# Patient Record
Sex: MALE | Race: WHITE | NOT HISPANIC OR LATINO | URBAN - METROPOLITAN AREA
[De-identification: names, ages, dates, MRNs, and addresses within clinical notes are randomized per-mention and may not be internally consistent; named-entity substitution may affect disease eponyms.]

---

## 2019-08-16 ENCOUNTER — APPOINTMENT (OUTPATIENT)
Dept: URBAN - METROPOLITAN AREA CLINIC 186 | Age: 54
Setting detail: DERMATOLOGY
End: 2019-08-17

## 2019-08-16 DIAGNOSIS — Z87.2 PERSONAL HISTORY OF DISEASES OF THE SKIN AND SUBCUTANEOUS TISSUE: ICD-10-CM

## 2019-08-16 DIAGNOSIS — L82.0 INFLAMED SEBORRHEIC KERATOSIS: ICD-10-CM

## 2019-08-16 DIAGNOSIS — L82.1 OTHER SEBORRHEIC KERATOSIS: ICD-10-CM

## 2019-08-16 DIAGNOSIS — D22 MELANOCYTIC NEVI: ICD-10-CM

## 2019-08-16 PROBLEM — D48.5 NEOPLASM OF UNCERTAIN BEHAVIOR OF SKIN: Status: ACTIVE | Noted: 2019-08-16

## 2019-08-16 PROCEDURE — OTHER DIAGNOSIS COMMENT: OTHER

## 2019-08-16 PROCEDURE — 11103 TANGNTL BX SKIN EA SEP/ADDL: CPT

## 2019-08-16 PROCEDURE — OTHER LIQUID NITROGEN: OTHER

## 2019-08-16 PROCEDURE — OTHER SUNSCREEN RECOMMENDATIONS: OTHER

## 2019-08-16 PROCEDURE — OTHER COUNSELING: OTHER

## 2019-08-16 PROCEDURE — OTHER REASSURANCE: OTHER

## 2019-08-16 PROCEDURE — 17110 DESTRUCT B9 LESION 1-14: CPT

## 2019-08-16 PROCEDURE — OTHER BIOPSY BY SHAVE METHOD: OTHER

## 2019-08-16 PROCEDURE — 11102 TANGNTL BX SKIN SINGLE LES: CPT | Mod: 59

## 2019-08-16 PROCEDURE — OTHER EDUCATIONAL RESOURCES PROVIDED: OTHER

## 2019-08-16 PROCEDURE — 99213 OFFICE O/P EST LOW 20 MIN: CPT | Mod: 25

## 2019-08-16 ASSESSMENT — LOCATION SIMPLE DESCRIPTION DERM
LOCATION SIMPLE: RIGHT UPPER BACK
LOCATION SIMPLE: LEFT UPPER BACK
LOCATION SIMPLE: RIGHT LOWER BACK

## 2019-08-16 ASSESSMENT — LOCATION DETAILED DESCRIPTION DERM
LOCATION DETAILED: RIGHT SUPERIOR LATERAL UPPER BACK
LOCATION DETAILED: RIGHT INFERIOR LATERAL MIDBACK
LOCATION DETAILED: LEFT SUPERIOR LATERAL UPPER BACK
LOCATION DETAILED: LEFT SUPERIOR UPPER BACK

## 2019-08-16 ASSESSMENT — LOCATION ZONE DERM: LOCATION ZONE: TRUNK

## 2019-08-16 NOTE — PROCEDURE: LIQUID NITROGEN
Medical Necessity Clause: This procedure was medically necessary because the lesions that were treated were:
Include Z78.9 (Other Specified Conditions Influencing Health Status) As An Associated Diagnosis?: No
Post-Care Instructions: I reviewed with the patient in detail post-care instructions. Patient is to wear sunprotection, and avoid picking at any of the treated lesions. Pt may apply Vaseline to crusted or scabbing areas.
Duration Of Freeze Thaw-Cycle (Seconds): 10-15
Consent: The patient's Verbal consent was obtained including but not limited to risks of crusting, scabbing, blistering, scarring, darker or lighter pigmentary change, recurrence, incomplete removal and infection.
Detail Level: Simple
Medical Necessity Information: It is in your best interest to select a reason for this procedure from the list below. All of these items fulfill various CMS LCD requirements except the new and changing color options.
Number Of Freeze-Thaw Cycles: 1 freeze-thaw cycle

## 2019-08-16 NOTE — PROCEDURE: BIOPSY BY SHAVE METHOD
Hemostasis: Drysol
Electrodesiccation Text: The wound bed was treated with electrodesiccation after the biopsy was performed.
Anesthesia Volume In Cc (Will Not Render If 0): 0.3
Detail Level: Detailed
Render Path Notes In Note?: No
Billing Type: Third-Party Bill
Post-Care Instructions: I reviewed with the patient in detail post-care instructions. Patient is to keep the biopsy site dry overnight, and then apply Vaseline twice daily until healed. Patient may apply hydrogen peroxide soaks to remove any crusting.
Anesthesia Type: 1% lidocaine with 1:100,000 epinephrine and a 1:10 solution of 8.4% sodium bicarbonate
Size Of Lesion In Cm: 0
Notification Instructions: Patient will be notified of biopsy results. However, patient instructed to call the office if not contacted within 2 weeks.
Type Of Destruction Used: Curettage
Biopsy Method: Personna blade
Wound Care: Petrolatum
Was A Bandage Applied: Yes
Silver Nitrate Text: The wound bed was treated with silver nitrate after the biopsy was performed.
Dressing: Band-Aid
Curettage Text: The wound bed was treated with curettage after the biopsy was performed.
Electrodesiccation And Curettage Text: The wound bed was treated with electrodesiccation and curettage after the biopsy was performed.
Biopsy Type: H and E
Body Location Override (Optional - Billing Will Still Be Based On Selected Body Map Location If Applicable): right flank
Cryotherapy Text: The wound bed was treated with cryotherapy after the biopsy was performed.
Consent: Written consent was obtained and risks were reviewed including but not limited to scarring, infection, bleeding, scabbing, incomplete removal, nerve damage and allergy to anesthesia.
Depth Of Biopsy: dermis

## 2019-08-16 NOTE — HPI: OTHER
Condition:: Patient states he gets a sensation on left shoulder blade that he thinks is odd. Many moles and lesions in this area. No pain, more tingling. States this has been going on around three months.
Please Describe Your Condition:: Tingling, no pain, no itching.

## 2019-09-24 ENCOUNTER — APPOINTMENT (OUTPATIENT)
Dept: URBAN - METROPOLITAN AREA CLINIC 186 | Age: 54
Setting detail: DERMATOLOGY
End: 2019-09-24

## 2019-09-24 PROBLEM — C44.41 BASAL CELL CARCINOMA OF SKIN OF SCALP AND NECK: Status: ACTIVE | Noted: 2019-09-24

## 2019-09-24 PROCEDURE — OTHER EXCISION: OTHER

## 2019-09-24 PROCEDURE — 11622 EXC S/N/H/F/G MAL+MRG 1.1-2: CPT

## 2019-09-24 PROCEDURE — 12042 INTMD RPR N-HF/GENIT2.6-7.5: CPT

## 2019-09-24 NOTE — PROCEDURE: EXCISION
Path Notes (To The Dermatopathologist): Please check margins. Superior edge scored and Inked. #[id number] Path Notes (To The Dermatopathologist): Please check margins. Superior edge scored and Inked. #[id number]

## 2019-12-13 ENCOUNTER — APPOINTMENT (OUTPATIENT)
Dept: URBAN - METROPOLITAN AREA CLINIC 186 | Age: 54
Setting detail: DERMATOLOGY
End: 2019-12-13

## 2019-12-13 DIAGNOSIS — Z87.2 PERSONAL HISTORY OF DISEASES OF THE SKIN AND SUBCUTANEOUS TISSUE: ICD-10-CM

## 2019-12-13 DIAGNOSIS — D22 MELANOCYTIC NEVI: ICD-10-CM

## 2019-12-13 DIAGNOSIS — L82.1 OTHER SEBORRHEIC KERATOSIS: ICD-10-CM

## 2019-12-13 DIAGNOSIS — D18.0 HEMANGIOMA: ICD-10-CM

## 2019-12-13 DIAGNOSIS — L81.4 OTHER MELANIN HYPERPIGMENTATION: ICD-10-CM

## 2019-12-13 DIAGNOSIS — L82.0 INFLAMED SEBORRHEIC KERATOSIS: ICD-10-CM

## 2019-12-13 DIAGNOSIS — Z85.828 PERSONAL HISTORY OF OTHER MALIGNANT NEOPLASM OF SKIN: ICD-10-CM

## 2019-12-13 DIAGNOSIS — L57.8 OTHER SKIN CHANGES DUE TO CHRONIC EXPOSURE TO NONIONIZING RADIATION: ICD-10-CM

## 2019-12-13 PROBLEM — D48.5 NEOPLASM OF UNCERTAIN BEHAVIOR OF SKIN: Status: ACTIVE | Noted: 2019-12-13

## 2019-12-13 PROBLEM — D22.61 MELANOCYTIC NEVI OF RIGHT UPPER LIMB, INCLUDING SHOULDER: Status: ACTIVE | Noted: 2019-12-13

## 2019-12-13 PROBLEM — D22.72 MELANOCYTIC NEVI OF LEFT LOWER LIMB, INCLUDING HIP: Status: ACTIVE | Noted: 2019-12-13

## 2019-12-13 PROBLEM — D18.01 HEMANGIOMA OF SKIN AND SUBCUTANEOUS TISSUE: Status: ACTIVE | Noted: 2019-12-13

## 2019-12-13 PROBLEM — D22.5 MELANOCYTIC NEVI OF TRUNK: Status: ACTIVE | Noted: 2019-12-13

## 2019-12-13 PROBLEM — D22.71 MELANOCYTIC NEVI OF RIGHT LOWER LIMB, INCLUDING HIP: Status: ACTIVE | Noted: 2019-12-13

## 2019-12-13 PROBLEM — D22.62 MELANOCYTIC NEVI OF LEFT UPPER LIMB, INCLUDING SHOULDER: Status: ACTIVE | Noted: 2019-12-13

## 2019-12-13 PROCEDURE — OTHER BIOPSY BY SHAVE METHOD: OTHER

## 2019-12-13 PROCEDURE — OTHER MIPS QUALITY: OTHER

## 2019-12-13 PROCEDURE — OTHER EDUCATIONAL RESOURCES PROVIDED: OTHER

## 2019-12-13 PROCEDURE — 99214 OFFICE O/P EST MOD 30 MIN: CPT | Mod: 25

## 2019-12-13 PROCEDURE — OTHER LIQUID NITROGEN: OTHER

## 2019-12-13 PROCEDURE — 17110 DESTRUCT B9 LESION 1-14: CPT

## 2019-12-13 PROCEDURE — 11102 TANGNTL BX SKIN SINGLE LES: CPT | Mod: 59

## 2019-12-13 PROCEDURE — OTHER SUNSCREEN RECOMMENDATIONS: OTHER

## 2019-12-13 PROCEDURE — OTHER COUNSELING: OTHER

## 2019-12-13 ASSESSMENT — LOCATION DETAILED DESCRIPTION DERM
LOCATION DETAILED: LEFT ANTERIOR DISTAL THIGH
LOCATION DETAILED: INFERIOR THORACIC SPINE
LOCATION DETAILED: RIGHT SUPERIOR UPPER BACK
LOCATION DETAILED: MIDDLE STERNUM
LOCATION DETAILED: LEFT SUPERIOR LATERAL UPPER BACK
LOCATION DETAILED: RIGHT ANTERIOR PROXIMAL THIGH
LOCATION DETAILED: LEFT LATERAL ABDOMEN
LOCATION DETAILED: EPIGASTRIC SKIN
LOCATION DETAILED: RIGHT INFERIOR UPPER BACK
LOCATION DETAILED: LEFT SUPERIOR MEDIAL UPPER BACK
LOCATION DETAILED: RIGHT LATERAL SUPERIOR CHEST
LOCATION DETAILED: RIGHT CENTRAL EYEBROW
LOCATION DETAILED: LEFT DISTAL POSTERIOR UPPER ARM
LOCATION DETAILED: RIGHT PROXIMAL POSTERIOR UPPER ARM
LOCATION DETAILED: UPPER STERNUM

## 2019-12-13 ASSESSMENT — LOCATION SIMPLE DESCRIPTION DERM
LOCATION SIMPLE: ABDOMEN
LOCATION SIMPLE: LEFT THIGH
LOCATION SIMPLE: RIGHT EYEBROW
LOCATION SIMPLE: LEFT POSTERIOR UPPER ARM
LOCATION SIMPLE: RIGHT UPPER BACK
LOCATION SIMPLE: RIGHT THIGH
LOCATION SIMPLE: CHEST
LOCATION SIMPLE: UPPER BACK
LOCATION SIMPLE: RIGHT POSTERIOR UPPER ARM
LOCATION SIMPLE: LEFT UPPER BACK

## 2019-12-13 ASSESSMENT — LOCATION ZONE DERM
LOCATION ZONE: FACE
LOCATION ZONE: ARM
LOCATION ZONE: LEG
LOCATION ZONE: TRUNK

## 2019-12-13 NOTE — PROCEDURE: LIQUID NITROGEN
Consent: The patient's Verbal consent was obtained including but not limited to risks of crusting, scabbing, blistering, scarring, darker or lighter pigmentary change, recurrence, incomplete removal and infection.
Medical Necessity Information: It is in your best interest to select a reason for this procedure from the list below. All of these items fulfill various CMS LCD requirements except the new and changing color options.
Render Post-Care Instructions In Note?: no
Duration Of Freeze Thaw-Cycle (Seconds): 10-15
Detail Level: Simple
Number Of Freeze-Thaw Cycles: 1 freeze-thaw cycle
Post-Care Instructions: I reviewed with the patient in detail post-care instructions. Patient is to wear sunprotection, and avoid picking at any of the treated lesions. Pt may apply Vaseline to crusted or scabbing areas.
Medical Necessity Clause: This procedure was medically necessary because the lesions that were treated were:

## 2020-01-09 NOTE — PROCEDURE: EXCISION
all other ROS negative except as per HPI Complex Repair And Split-Thickness Skin Graft Text: The defect edges were debeveled with a #15 scalpel blade.  The primary defect was closed partially with a complex linear closure.  Given the location of the defect, shape of the defect and the proximity to free margins a split thickness skin graft was deemed most appropriate to repair the remaining defect.  The graft was trimmed to fit the size of the remaining defect.  The graft was then placed in the primary defect, oriented appropriately, and sutured into place.

## 2021-05-07 ENCOUNTER — APPOINTMENT (OUTPATIENT)
Dept: URBAN - METROPOLITAN AREA CLINIC 186 | Age: 56
Setting detail: DERMATOLOGY
End: 2021-05-07

## 2021-05-07 DIAGNOSIS — Z87.2 PERSONAL HISTORY OF DISEASES OF THE SKIN AND SUBCUTANEOUS TISSUE: ICD-10-CM

## 2021-05-07 DIAGNOSIS — L57.8 OTHER SKIN CHANGES DUE TO CHRONIC EXPOSURE TO NONIONIZING RADIATION: ICD-10-CM

## 2021-05-07 DIAGNOSIS — Z85.828 PERSONAL HISTORY OF OTHER MALIGNANT NEOPLASM OF SKIN: ICD-10-CM

## 2021-05-07 DIAGNOSIS — D18.0 HEMANGIOMA: ICD-10-CM

## 2021-05-07 DIAGNOSIS — D22 MELANOCYTIC NEVI: ICD-10-CM

## 2021-05-07 DIAGNOSIS — D485 NEOPLASM OF UNCERTAIN BEHAVIOR OF SKIN: ICD-10-CM

## 2021-05-07 DIAGNOSIS — L81.4 OTHER MELANIN HYPERPIGMENTATION: ICD-10-CM

## 2021-05-07 DIAGNOSIS — L82.1 OTHER SEBORRHEIC KERATOSIS: ICD-10-CM

## 2021-05-07 PROBLEM — D22.72 MELANOCYTIC NEVI OF LEFT LOWER LIMB, INCLUDING HIP: Status: ACTIVE | Noted: 2021-05-07

## 2021-05-07 PROBLEM — D22.61 MELANOCYTIC NEVI OF RIGHT UPPER LIMB, INCLUDING SHOULDER: Status: ACTIVE | Noted: 2021-05-07

## 2021-05-07 PROBLEM — D22.5 MELANOCYTIC NEVI OF TRUNK: Status: ACTIVE | Noted: 2021-05-07

## 2021-05-07 PROBLEM — D22.62 MELANOCYTIC NEVI OF LEFT UPPER LIMB, INCLUDING SHOULDER: Status: ACTIVE | Noted: 2021-05-07

## 2021-05-07 PROBLEM — D18.01 HEMANGIOMA OF SKIN AND SUBCUTANEOUS TISSUE: Status: ACTIVE | Noted: 2021-05-07

## 2021-05-07 PROBLEM — D48.5 NEOPLASM OF UNCERTAIN BEHAVIOR OF SKIN: Status: ACTIVE | Noted: 2021-05-07

## 2021-05-07 PROBLEM — D22.71 MELANOCYTIC NEVI OF RIGHT LOWER LIMB, INCLUDING HIP: Status: ACTIVE | Noted: 2021-05-07

## 2021-05-07 PROCEDURE — OTHER BIOPSY BY SHAVE METHOD: OTHER

## 2021-05-07 PROCEDURE — 99213 OFFICE O/P EST LOW 20 MIN: CPT | Mod: 25

## 2021-05-07 PROCEDURE — 11102 TANGNTL BX SKIN SINGLE LES: CPT

## 2021-05-07 PROCEDURE — OTHER COUNSELING: OTHER

## 2021-05-07 PROCEDURE — OTHER SUNSCREEN RECOMMENDATIONS: OTHER

## 2021-05-07 PROCEDURE — OTHER REASSURANCE: OTHER

## 2021-05-07 ASSESSMENT — LOCATION SIMPLE DESCRIPTION DERM
LOCATION SIMPLE: LEFT POSTERIOR UPPER ARM
LOCATION SIMPLE: RIGHT THIGH
LOCATION SIMPLE: RIGHT EYEBROW
LOCATION SIMPLE: RIGHT ZYGOMA
LOCATION SIMPLE: RIGHT UPPER BACK
LOCATION SIMPLE: CHEST
LOCATION SIMPLE: LEFT THIGH
LOCATION SIMPLE: RIGHT ANTERIOR NECK
LOCATION SIMPLE: LEFT SCALP
LOCATION SIMPLE: RIGHT POSTERIOR UPPER ARM
LOCATION SIMPLE: LEFT UPPER BACK
LOCATION SIMPLE: UPPER BACK
LOCATION SIMPLE: ABDOMEN

## 2021-05-07 ASSESSMENT — LOCATION ZONE DERM
LOCATION ZONE: ARM
LOCATION ZONE: TRUNK
LOCATION ZONE: SCALP
LOCATION ZONE: LEG
LOCATION ZONE: FACE
LOCATION ZONE: NECK

## 2021-05-07 ASSESSMENT — LOCATION DETAILED DESCRIPTION DERM
LOCATION DETAILED: INFERIOR THORACIC SPINE
LOCATION DETAILED: UPPER STERNUM
LOCATION DETAILED: LEFT LATERAL SUPERIOR CHEST
LOCATION DETAILED: LEFT MEDIAL FRONTAL SCALP
LOCATION DETAILED: EPIGASTRIC SKIN
LOCATION DETAILED: RIGHT CENTRAL EYEBROW
LOCATION DETAILED: RIGHT PROXIMAL POSTERIOR UPPER ARM
LOCATION DETAILED: RIGHT CLAVICULAR NECK
LOCATION DETAILED: LEFT DISTAL POSTERIOR UPPER ARM
LOCATION DETAILED: RIGHT CENTRAL ZYGOMA
LOCATION DETAILED: MIDDLE STERNUM
LOCATION DETAILED: RIGHT ANTERIOR PROXIMAL THIGH
LOCATION DETAILED: LEFT SUPERIOR MEDIAL UPPER BACK
LOCATION DETAILED: RIGHT LATERAL SUPERIOR CHEST
LOCATION DETAILED: LEFT ANTERIOR DISTAL THIGH
LOCATION DETAILED: RIGHT SUPERIOR MEDIAL UPPER BACK

## 2021-05-07 NOTE — PROCEDURE: REASSURANCE
Hide Include Location In Plan Question?: No
Additional Note: Benign reassurance given to patient
Detail Level: Zone

## 2021-05-07 NOTE — PROCEDURE: BIOPSY BY SHAVE METHOD
Hide Additional Size Dimension?: No
Was A Bandage Applied: Yes
Electrodesiccation And Curettage Text: The wound bed was treated with electrodesiccation and curettage after the biopsy was performed.
Additional Anesthesia Volume In Cc (Will Not Render If 0): 0
Hemostasis: Drysol
Biopsy Type: H and E
Anesthesia Type: 1% lidocaine with 1:100,000 epinephrine and a 1:10 solution of 8.4% sodium bicarbonate
Biopsy Method: Personna blade
Electrodesiccation Text: The wound bed was treated with electrodesiccation after the biopsy was performed.
Consent: Written consent was obtained and risks were reviewed including but not limited to scarring, infection, bleeding, scabbing, incomplete removal, nerve damage and allergy to anesthesia.
Information: Selecting Yes will display possible errors in your note based on the variables you have selected. This validation is only offered as a suggestion for you. PLEASE NOTE THAT THE VALIDATION TEXT WILL BE REMOVED WHEN YOU FINALIZE YOUR NOTE. IF YOU WANT TO FAX A PRELIMINARY NOTE YOU WILL NEED TO TOGGLE THIS TO 'NO' IF YOU DO NOT WANT IT IN YOUR FAXED NOTE.
Detail Level: Detailed
Notification Instructions: Patient will be notified of biopsy results. However, patient instructed to call the office if not contacted within 2 weeks.
Billing Type: Third-Party Bill
Post-Care Instructions: I reviewed with the patient in detail post-care instructions. Patient is to keep the biopsy site dry overnight, and then apply Vaseline twice daily until healed. Patient may apply hydrogen peroxide soaks to remove any crusting.
Wound Care: Petrolatum
Type Of Destruction Used: Curettage
Anesthesia Volume In Cc (Will Not Render If 0): 0.3
Cryotherapy Text: The wound bed was treated with cryotherapy after the biopsy was performed.
Dressing: Band-Aid
Curettage Text: The wound bed was treated with curettage after the biopsy was performed.
Depth Of Biopsy: dermis
Silver Nitrate Text: The wound bed was treated with silver nitrate after the biopsy was performed.

## 2022-02-18 ENCOUNTER — APPOINTMENT (OUTPATIENT)
Dept: URBAN - METROPOLITAN AREA CLINIC 186 | Age: 57
Setting detail: DERMATOLOGY
End: 2022-02-18

## 2022-02-18 PROBLEM — C44.319 BASAL CELL CARCINOMA OF SKIN OF OTHER PARTS OF FACE: Status: ACTIVE | Noted: 2022-02-18

## 2022-02-18 PROCEDURE — 12051 INTMD RPR FACE/MM 2.5 CM/<: CPT

## 2022-02-18 PROCEDURE — OTHER EXCISION: OTHER

## 2022-02-18 PROCEDURE — 11642 EXC F/E/E/N/L MAL+MRG 1.1-2: CPT

## 2022-02-18 NOTE — PROCEDURE: EXCISION
Call Center TCM Note      Responses   [facility] patient discharged from?  [place]   Does the patient have one of the following disease processes/diagnoses(primary or secondary)?  Stroke (TIA)   TCM attempt successful?  Yes   Call start time  1007   Call end time  [phone]   Discharge diagnosis  TIA   Meds reviewed with patient/caregiver?  Yes   Is the patient having any side effects they believe may be caused by any medication additions or changes?  No   Does the patient have all medications ordered at discharge?  Yes   Is the patient taking all medications as directed (includes completed medication regime)?  Yes   Medication comments  -- [Pt has started taking new meds. No issues or concerns at this time. ]   Does the patient have a primary care provider?   Yes   Does the patient have an appointment with their PCP within 7 days of discharge?  Yes   Comments regarding PCP  -- [Appt on the 19th ]   Has the patient kept scheduled appointments due by today?  N/A   What is the Home health agency?   -- [None ordered ]   Has home health visited the patient within 72 hours of discharge?  N/A   What DME was ordered?  -- [None ordered ]   Psychosocial issues?  No   Does the patient require any assistance with activities of daily living such as eating, bathing, dressing, walking, etc.?  No   ADL comments  -- [Doing well with ADLs ]   Does the patient have any residual symptoms from stroke/TIA?  No   Residual symptoms comments  -- [Pt is weak but is doing well ]   Does the patient understand the diet ordered at discharge?  Yes   Did the patient receive a copy of their discharge instructions?  Yes   Nursing interventions  Reviewed instructions with patient   What is the patient's perception of their health status since discharge?  Improving   Nursing interventions  Nurse provided patient education   Is the patient able to teach back FAST for Stroke?  Yes   Is the patient/caregiver able to teach back the risk factors for a  stroke?  High blood pressure-goal below 120/80, Smoking, History of TIAs   Is the patient/caregiver able to teach back signs and symptoms related to disease process for when to call PCP?  Yes   Is the patient/caregiver able to teach back signs and symptoms related to disease process for when to call 911?  Yes   Is the patient/caregiver able to teach back the hierarchy of who to call/visit for symptoms/problems? PCP, Specialist, Home health nurse, Urgent Care, ED, 911  Yes   TCM call completed?  Yes          Denise Milligan RN    5/15/2020, 10:11       Double Island Pedicle Flap Text: The defect edges were debeveled with a #15 scalpel blade.  Given the location of the defect, shape of the defect and the proximity to free margins a double island pedicle advancement flap was deemed most appropriate.  Using a sterile surgical marker, an appropriate advancement flap was drawn incorporating the defect, outlining the appropriate donor tissue and placing the expected incisions within the relaxed skin tension lines where possible.    The area thus outlined was incised deep to adipose tissue with a #15 scalpel blade.  The skin margins were undermined to an appropriate distance in all directions around the primary defect and laterally outward around the island pedicle utilizing iris scissors.  There was minimal undermining beneath the pedicle flap.

## 2022-02-18 NOTE — HPI: SKIN LESION
What Type Of Note Output Would You Prefer (Optional)?: Bullet Format
How Severe Is Your Skin Lesion?: moderate
Has Your Skin Lesion Been Treated?: not been treated
Is This A New Presentation, Or A Follow-Up?: Skin Lesion
Additional History: Basal Cell Carcinoma right central zygoma biopsy proven on 5/11/2021

## 2022-02-18 NOTE — PROCEDURE: EXCISION
Spoke to pts wife tanner. Pt has been using triaminocolone cream for years, rash on the back of his ear. The rash comes and goes and the triaminocolone is all that helps pt.     Not on pts current med list    Would you order for pt?      Advancement Flap (Double) Text: The defect edges were debeveled with a #15 scalpel blade.  Given the location of the defect and the proximity to free margins a double advancement flap was deemed most appropriate.  Using a sterile surgical marker, the appropriate advancement flaps were drawn incorporating the defect and placing the expected incisions within the relaxed skin tension lines where possible.    The area thus outlined was incised deep to adipose tissue with a #15 scalpel blade.  The skin margins were undermined to an appropriate distance in all directions utilizing iris scissors.

## 2022-02-25 ENCOUNTER — APPOINTMENT (OUTPATIENT)
Dept: URBAN - METROPOLITAN AREA CLINIC 186 | Age: 57
Setting detail: DERMATOLOGY
End: 2022-02-25

## 2022-02-25 DIAGNOSIS — Z48.02 ENCOUNTER FOR REMOVAL OF SUTURES: ICD-10-CM

## 2022-02-25 PROCEDURE — OTHER SUTURE REMOVAL (GLOBAL PERIOD): OTHER

## 2022-02-25 ASSESSMENT — LOCATION ZONE DERM: LOCATION ZONE: FACE

## 2022-02-25 ASSESSMENT — LOCATION DETAILED DESCRIPTION DERM: LOCATION DETAILED: RIGHT CENTRAL ZYGOMA

## 2022-02-25 ASSESSMENT — LOCATION SIMPLE DESCRIPTION DERM: LOCATION SIMPLE: RIGHT ZYGOMA

## 2022-02-25 NOTE — PROCEDURE: SUTURE REMOVAL (GLOBAL PERIOD)
Detail Level: Detailed
Add 75812 Cpt? (Important Note: In 2017 The Use Of 53247 Is Being Tracked By Cms To Determine Future Global Period Reimbursement For Global Periods): no

## 2023-10-09 ENCOUNTER — APPOINTMENT (OUTPATIENT)
Dept: URBAN - METROPOLITAN AREA CLINIC 186 | Age: 58
Setting detail: DERMATOLOGY
End: 2023-10-09

## 2023-10-09 DIAGNOSIS — Z85.828 PERSONAL HISTORY OF OTHER MALIGNANT NEOPLASM OF SKIN: ICD-10-CM

## 2023-10-09 DIAGNOSIS — L57.8 OTHER SKIN CHANGES DUE TO CHRONIC EXPOSURE TO NONIONIZING RADIATION: ICD-10-CM

## 2023-10-09 DIAGNOSIS — L81.4 OTHER MELANIN HYPERPIGMENTATION: ICD-10-CM

## 2023-10-09 DIAGNOSIS — D18.0 HEMANGIOMA: ICD-10-CM

## 2023-10-09 DIAGNOSIS — L82.0 INFLAMED SEBORRHEIC KERATOSIS: ICD-10-CM

## 2023-10-09 DIAGNOSIS — Z87.2 PERSONAL HISTORY OF DISEASES OF THE SKIN AND SUBCUTANEOUS TISSUE: ICD-10-CM

## 2023-10-09 DIAGNOSIS — L82.1 OTHER SEBORRHEIC KERATOSIS: ICD-10-CM

## 2023-10-09 DIAGNOSIS — D22 MELANOCYTIC NEVI: ICD-10-CM

## 2023-10-09 DIAGNOSIS — L71.8 OTHER ROSACEA: ICD-10-CM

## 2023-10-09 PROBLEM — D22.72 MELANOCYTIC NEVI OF LEFT LOWER LIMB, INCLUDING HIP: Status: ACTIVE | Noted: 2023-10-09

## 2023-10-09 PROBLEM — D22.71 MELANOCYTIC NEVI OF RIGHT LOWER LIMB, INCLUDING HIP: Status: ACTIVE | Noted: 2023-10-09

## 2023-10-09 PROBLEM — D22.61 MELANOCYTIC NEVI OF RIGHT UPPER LIMB, INCLUDING SHOULDER: Status: ACTIVE | Noted: 2023-10-09

## 2023-10-09 PROBLEM — D22.62 MELANOCYTIC NEVI OF LEFT UPPER LIMB, INCLUDING SHOULDER: Status: ACTIVE | Noted: 2023-10-09

## 2023-10-09 PROBLEM — D22.5 MELANOCYTIC NEVI OF TRUNK: Status: ACTIVE | Noted: 2023-10-09

## 2023-10-09 PROBLEM — D18.01 HEMANGIOMA OF SKIN AND SUBCUTANEOUS TISSUE: Status: ACTIVE | Noted: 2023-10-09

## 2023-10-09 PROCEDURE — OTHER LIQUID NITROGEN: OTHER

## 2023-10-09 PROCEDURE — OTHER PRESCRIPTION MEDICATION MANAGEMENT: OTHER

## 2023-10-09 PROCEDURE — OTHER SUNSCREEN RECOMMENDATIONS: OTHER

## 2023-10-09 PROCEDURE — OTHER COUNSELING: OTHER

## 2023-10-09 PROCEDURE — 17110 DESTRUCT B9 LESION 1-14: CPT

## 2023-10-09 PROCEDURE — 99214 OFFICE O/P EST MOD 30 MIN: CPT | Mod: 25

## 2023-10-09 ASSESSMENT — LOCATION DETAILED DESCRIPTION DERM
LOCATION DETAILED: INFERIOR THORACIC SPINE
LOCATION DETAILED: RIGHT CENTRAL EYEBROW
LOCATION DETAILED: LEFT SUPERIOR MEDIAL UPPER BACK
LOCATION DETAILED: RIGHT LATERAL SUPERIOR CHEST
LOCATION DETAILED: LEFT ANTERIOR DISTAL THIGH
LOCATION DETAILED: EPIGASTRIC SKIN
LOCATION DETAILED: UPPER STERNUM
LOCATION DETAILED: LEFT LATERAL SUPERIOR CHEST
LOCATION DETAILED: RIGHT CLAVICULAR NECK
LOCATION DETAILED: RIGHT INFERIOR CENTRAL MALAR CHEEK
LOCATION DETAILED: MIDDLE STERNUM
LOCATION DETAILED: LEFT INFERIOR CENTRAL MALAR CHEEK
LOCATION DETAILED: RIGHT SUPERIOR MEDIAL UPPER BACK
LOCATION DETAILED: RIGHT ANTERIOR PROXIMAL THIGH
LOCATION DETAILED: RIGHT CLAVICULAR SKIN
LOCATION DETAILED: LEFT SUPERIOR OCCIPITAL SCALP
LOCATION DETAILED: LEFT DISTAL POSTERIOR UPPER ARM
LOCATION DETAILED: RIGHT PROXIMAL POSTERIOR UPPER ARM

## 2023-10-09 ASSESSMENT — LOCATION SIMPLE DESCRIPTION DERM
LOCATION SIMPLE: RIGHT UPPER BACK
LOCATION SIMPLE: RIGHT ANTERIOR NECK
LOCATION SIMPLE: ABDOMEN
LOCATION SIMPLE: LEFT THIGH
LOCATION SIMPLE: LEFT CHEEK
LOCATION SIMPLE: CHEST
LOCATION SIMPLE: LEFT UPPER BACK
LOCATION SIMPLE: RIGHT EYEBROW
LOCATION SIMPLE: RIGHT THIGH
LOCATION SIMPLE: RIGHT CLAVICULAR SKIN
LOCATION SIMPLE: POSTERIOR SCALP
LOCATION SIMPLE: RIGHT CHEEK
LOCATION SIMPLE: UPPER BACK
LOCATION SIMPLE: RIGHT POSTERIOR UPPER ARM
LOCATION SIMPLE: LEFT POSTERIOR UPPER ARM

## 2023-10-09 ASSESSMENT — LOCATION ZONE DERM
LOCATION ZONE: FACE
LOCATION ZONE: LEG
LOCATION ZONE: SCALP
LOCATION ZONE: ARM
LOCATION ZONE: TRUNK
LOCATION ZONE: NECK

## 2023-10-09 NOTE — PROCEDURE: PRESCRIPTION MEDICATION MANAGEMENT
Render In Strict Bullet Format?: No
Initiate Treatment: Metronidazole cream ( wife has prescription at home, will use hers to try and call if he wants his own)
Detail Level: Zone

## 2023-10-09 NOTE — PROCEDURE: LIQUID NITROGEN
Medical Necessity Information: It is in your best interest to select a reason for this procedure from the list below. All of these items fulfill various CMS LCD requirements except the new and changing color options.
Consent: The patient's consent was obtained including but not limited to risks of crusting, scabbing, blistering, scarring, darker or lighter pigmentary change, recurrence, incomplete removal and infection.
Add 52 Modifier (Optional): no
Show Topical Anesthesia Variable?: Yes
Detail Level: Detailed
Number Of Freeze-Thaw Cycles: 1 freeze-thaw cycle
Post-Care Instructions: I reviewed with the patient in detail post-care instructions. Patient is to wear sunprotection, and avoid picking at any of the treated lesions. Pt may apply Vaseline to crusted or scabbing areas.
Medical Necessity Clause: This procedure was medically necessary because the lesions that were treated were:
Duration Of Freeze Thaw-Cycle (Seconds): 10
Spray Paint Text: The liquid nitrogen was applied to the skin utilizing a spray paint frosting technique.

## 2024-11-06 NOTE — PROCEDURE: BIOPSY BY SHAVE METHOD
11/06/24 12:42 PM     Chart reviewed for   Controlling High Blood Pressure    ; nothing is submitted to the patient's insurance at this time.     Cristy Zhou   [facility]  
Depth Of Biopsy: dermis
Post-Care Instructions: I reviewed with the patient in detail post-care instructions. Patient is to keep the biopsy site dry overnight, and then apply Vaseline twice daily until healed. Patient may apply hydrogen peroxide soaks to remove any crusting.
Bill For Surgical Tray: no
Consent: Written consent was obtained and risks were reviewed including but not limited to scarring, infection, bleeding, scabbing, incomplete removal, nerve damage and allergy to anesthesia.
X Size Of Lesion In Cm: 0
Type Of Destruction Used: Curettage
Electrodesiccation And Curettage Text: The wound bed was treated with electrodesiccation and curettage after the biopsy was performed.
Anesthesia Volume In Cc (Will Not Render If 0): 0.3
Curettage Text: The wound bed was treated with curettage after the biopsy was performed.
Hemostasis: Drysol
Silver Nitrate Text: The wound bed was treated with silver nitrate after the biopsy was performed.
Cryotherapy Text: The wound bed was treated with cryotherapy after the biopsy was performed.
Wound Care: Petrolatum
Billing Type: Third-Party Bill
Notification Instructions: Patient will be notified of biopsy results. However, patient instructed to call the office if not contacted within 2 weeks.
Electrodesiccation Text: The wound bed was treated with electrodesiccation after the biopsy was performed.
Detail Level: Detailed
Dressing: Band-Aid
Biopsy Type: H and E
Was A Bandage Applied: Yes
Anesthesia Type: 1% lidocaine with 1:100,000 epinephrine and a 1:10 solution of 8.4% sodium bicarbonate
Biopsy Method: Personna blade